# Patient Record
Sex: MALE | Race: ASIAN | NOT HISPANIC OR LATINO | Employment: OTHER | URBAN - METROPOLITAN AREA
[De-identification: names, ages, dates, MRNs, and addresses within clinical notes are randomized per-mention and may not be internally consistent; named-entity substitution may affect disease eponyms.]

---

## 2021-02-23 ENCOUNTER — APPOINTMENT (OUTPATIENT)
Dept: INTERPRETER SERVICES | Facility: CLINIC | Age: 71
End: 2021-02-23
Payer: COMMERCIAL

## 2021-02-23 ENCOUNTER — HOSPITAL ENCOUNTER (INPATIENT)
Facility: CLINIC | Age: 71
LOS: 1 days | Discharge: HOME | DRG: 546 | End: 2021-02-23
Attending: EMERGENCY MEDICINE | Admitting: INTERNAL MEDICINE
Payer: COMMERCIAL

## 2021-02-23 ENCOUNTER — PATIENT OUTREACH (OUTPATIENT)
Dept: CARE COORDINATION | Facility: CLINIC | Age: 71
End: 2021-02-23

## 2021-02-23 ENCOUNTER — APPOINTMENT (OUTPATIENT)
Dept: GENERAL RADIOLOGY | Facility: CLINIC | Age: 71
DRG: 546 | End: 2021-02-23
Attending: EMERGENCY MEDICINE
Payer: COMMERCIAL

## 2021-02-23 VITALS
DIASTOLIC BLOOD PRESSURE: 70 MMHG | OXYGEN SATURATION: 97 % | WEIGHT: 158.07 LBS | TEMPERATURE: 98.1 F | RESPIRATION RATE: 20 BRPM | SYSTOLIC BLOOD PRESSURE: 109 MMHG | HEART RATE: 83 BPM

## 2021-02-23 DIAGNOSIS — I27.20 PULMONARY HYPERTENSION (H): ICD-10-CM

## 2021-02-23 DIAGNOSIS — R06.00 DYSPNEA, UNSPECIFIED TYPE: ICD-10-CM

## 2021-02-23 DIAGNOSIS — Z11.52 ENCOUNTER FOR SCREENING LABORATORY TESTING FOR SEVERE ACUTE RESPIRATORY SYNDROME CORONAVIRUS 2 (SARS-COV-2): ICD-10-CM

## 2021-02-23 DIAGNOSIS — M34.9 SCLERODERMA (H): ICD-10-CM

## 2021-02-23 LAB
ALBUMIN SERPL-MCNC: 3.5 G/DL (ref 3.4–5)
ALBUMIN SERPL-MCNC: 3.8 G/DL (ref 3.4–5)
ALP SERPL-CCNC: 369 U/L (ref 40–150)
ALT SERPL W P-5'-P-CCNC: 54 U/L (ref 0–70)
ANION GAP SERPL CALCULATED.3IONS-SCNC: 4 MMOL/L (ref 3–14)
ANION GAP SERPL CALCULATED.3IONS-SCNC: 6 MMOL/L (ref 3–14)
AST SERPL W P-5'-P-CCNC: 62 U/L (ref 0–45)
BASOPHILS # BLD AUTO: 0.1 10E9/L (ref 0–0.2)
BASOPHILS NFR BLD AUTO: 0.9 %
BILIRUB SERPL-MCNC: 0.6 MG/DL (ref 0.2–1.3)
BUN SERPL-MCNC: 32 MG/DL (ref 7–30)
BUN SERPL-MCNC: 36 MG/DL (ref 7–30)
CALCIUM SERPL-MCNC: 9.2 MG/DL (ref 8.5–10.1)
CALCIUM SERPL-MCNC: 9.2 MG/DL (ref 8.5–10.1)
CHLORIDE SERPL-SCNC: 112 MMOL/L (ref 94–109)
CHLORIDE SERPL-SCNC: 114 MMOL/L (ref 94–109)
CO2 SERPL-SCNC: 22 MMOL/L (ref 20–32)
CO2 SERPL-SCNC: 25 MMOL/L (ref 20–32)
CREAT SERPL-MCNC: 1.15 MG/DL (ref 0.66–1.25)
CREAT SERPL-MCNC: 1.27 MG/DL (ref 0.66–1.25)
DIFFERENTIAL METHOD BLD: ABNORMAL
EOSINOPHIL # BLD AUTO: 0.1 10E9/L (ref 0–0.7)
EOSINOPHIL NFR BLD AUTO: 2 %
ERYTHROCYTE [DISTWIDTH] IN BLOOD BY AUTOMATED COUNT: 17.6 % (ref 10–15)
ERYTHROCYTE [DISTWIDTH] IN BLOOD BY AUTOMATED COUNT: 17.7 % (ref 10–15)
FLUAV RNA RESP QL NAA+PROBE: NEGATIVE
FLUBV RNA RESP QL NAA+PROBE: NEGATIVE
GFR SERPL CREATININE-BSD FRML MDRD: 56 ML/MIN/{1.73_M2}
GFR SERPL CREATININE-BSD FRML MDRD: 63 ML/MIN/{1.73_M2}
GLUCOSE SERPL-MCNC: 107 MG/DL (ref 70–99)
GLUCOSE SERPL-MCNC: 107 MG/DL (ref 70–99)
HCT VFR BLD AUTO: 42.8 % (ref 40–53)
HCT VFR BLD AUTO: 43.9 % (ref 40–53)
HGB BLD-MCNC: 13.6 G/DL (ref 13.3–17.7)
HGB BLD-MCNC: 14.4 G/DL (ref 13.3–17.7)
IMM GRANULOCYTES # BLD: 0 10E9/L (ref 0–0.4)
IMM GRANULOCYTES NFR BLD: 0.2 %
INTERPRETATION ECG - MUSE: NORMAL
LABORATORY COMMENT REPORT: NORMAL
LYMPHOCYTES # BLD AUTO: 1.1 10E9/L (ref 0.8–5.3)
LYMPHOCYTES NFR BLD AUTO: 19.4 %
MAGNESIUM SERPL-MCNC: 2.2 MG/DL (ref 1.6–2.3)
MCH RBC QN AUTO: 28.8 PG (ref 26.5–33)
MCH RBC QN AUTO: 29.3 PG (ref 26.5–33)
MCHC RBC AUTO-ENTMCNC: 31.8 G/DL (ref 31.5–36.5)
MCHC RBC AUTO-ENTMCNC: 32.8 G/DL (ref 31.5–36.5)
MCV RBC AUTO: 89 FL (ref 78–100)
MCV RBC AUTO: 91 FL (ref 78–100)
MONOCYTES # BLD AUTO: 0.4 10E9/L (ref 0–1.3)
MONOCYTES NFR BLD AUTO: 6.5 %
NEUTROPHILS # BLD AUTO: 4 10E9/L (ref 1.6–8.3)
NEUTROPHILS NFR BLD AUTO: 71 %
NRBC # BLD AUTO: 0 10*3/UL
NRBC BLD AUTO-RTO: 0 /100
NT-PROBNP SERPL-MCNC: 5161 PG/ML (ref 0–900)
PHOSPHATE SERPL-MCNC: 3.5 MG/DL (ref 2.5–4.5)
PLATELET # BLD AUTO: 125 10E9/L (ref 150–450)
PLATELET # BLD AUTO: 139 10E9/L (ref 150–450)
POTASSIUM SERPL-SCNC: 3.9 MMOL/L (ref 3.4–5.3)
POTASSIUM SERPL-SCNC: 4 MMOL/L (ref 3.4–5.3)
PROT SERPL-MCNC: 7.6 G/DL (ref 6.8–8.8)
RBC # BLD AUTO: 4.72 10E12/L (ref 4.4–5.9)
RBC # BLD AUTO: 4.92 10E12/L (ref 4.4–5.9)
RSV RNA SPEC QL NAA+PROBE: NEGATIVE
SARS-COV-2 RNA RESP QL NAA+PROBE: NEGATIVE
SODIUM SERPL-SCNC: 142 MMOL/L (ref 133–144)
SODIUM SERPL-SCNC: 142 MMOL/L (ref 133–144)
SPECIMEN SOURCE: NORMAL
TROPONIN I SERPL-MCNC: 0.02 UG/L (ref 0–0.04)
WBC # BLD AUTO: 5.2 10E9/L (ref 4–11)
WBC # BLD AUTO: 5.6 10E9/L (ref 4–11)

## 2021-02-23 PROCEDURE — 83735 ASSAY OF MAGNESIUM: CPT | Performed by: EMERGENCY MEDICINE

## 2021-02-23 PROCEDURE — 85025 COMPLETE CBC W/AUTO DIFF WBC: CPT | Performed by: EMERGENCY MEDICINE

## 2021-02-23 PROCEDURE — 99285 EMERGENCY DEPT VISIT HI MDM: CPT | Mod: 25 | Performed by: EMERGENCY MEDICINE

## 2021-02-23 PROCEDURE — 83880 ASSAY OF NATRIURETIC PEPTIDE: CPT | Performed by: EMERGENCY MEDICINE

## 2021-02-23 PROCEDURE — 120N000003 HC R&B IMCU UMMC

## 2021-02-23 PROCEDURE — 96374 THER/PROPH/DIAG INJ IV PUSH: CPT | Performed by: EMERGENCY MEDICINE

## 2021-02-23 PROCEDURE — 80069 RENAL FUNCTION PANEL: CPT | Performed by: STUDENT IN AN ORGANIZED HEALTH CARE EDUCATION/TRAINING PROGRAM

## 2021-02-23 PROCEDURE — 71045 X-RAY EXAM CHEST 1 VIEW: CPT | Mod: 26 | Performed by: RADIOLOGY

## 2021-02-23 PROCEDURE — 87636 SARSCOV2 & INF A&B AMP PRB: CPT | Performed by: EMERGENCY MEDICINE

## 2021-02-23 PROCEDURE — 99234 HOSP IP/OBS SM DT SF/LOW 45: CPT | Mod: GC | Performed by: INTERNAL MEDICINE

## 2021-02-23 PROCEDURE — 36415 COLL VENOUS BLD VENIPUNCTURE: CPT | Performed by: STUDENT IN AN ORGANIZED HEALTH CARE EDUCATION/TRAINING PROGRAM

## 2021-02-23 PROCEDURE — 71045 X-RAY EXAM CHEST 1 VIEW: CPT

## 2021-02-23 PROCEDURE — 250N000011 HC RX IP 250 OP 636: Performed by: STUDENT IN AN ORGANIZED HEALTH CARE EDUCATION/TRAINING PROGRAM

## 2021-02-23 PROCEDURE — 85027 COMPLETE CBC AUTOMATED: CPT | Performed by: STUDENT IN AN ORGANIZED HEALTH CARE EDUCATION/TRAINING PROGRAM

## 2021-02-23 PROCEDURE — C9803 HOPD COVID-19 SPEC COLLECT: HCPCS | Performed by: EMERGENCY MEDICINE

## 2021-02-23 PROCEDURE — 80053 COMPREHEN METABOLIC PANEL: CPT | Performed by: EMERGENCY MEDICINE

## 2021-02-23 PROCEDURE — 250N000013 HC RX MED GY IP 250 OP 250 PS 637: Performed by: STUDENT IN AN ORGANIZED HEALTH CARE EDUCATION/TRAINING PROGRAM

## 2021-02-23 PROCEDURE — 93010 ELECTROCARDIOGRAM REPORT: CPT | Performed by: EMERGENCY MEDICINE

## 2021-02-23 PROCEDURE — 99207 PR CDG-CODE CATEGORY CHANGED: CPT | Performed by: INTERNAL MEDICINE

## 2021-02-23 PROCEDURE — 93005 ELECTROCARDIOGRAM TRACING: CPT | Performed by: EMERGENCY MEDICINE

## 2021-02-23 PROCEDURE — 84484 ASSAY OF TROPONIN QUANT: CPT | Performed by: EMERGENCY MEDICINE

## 2021-02-23 PROCEDURE — 99283 EMERGENCY DEPT VISIT LOW MDM: CPT | Mod: 25 | Performed by: EMERGENCY MEDICINE

## 2021-02-23 RX ORDER — FUROSEMIDE 20 MG
20 TABLET ORAL DAILY
Status: ON HOLD | COMMUNITY
End: 2021-02-23

## 2021-02-23 RX ORDER — SILDENAFIL CITRATE 20 MG/1
20 TABLET ORAL 3 TIMES DAILY
Status: DISCONTINUED | OUTPATIENT
Start: 2021-02-23 | End: 2021-02-23 | Stop reason: HOSPADM

## 2021-02-23 RX ORDER — SILDENAFIL CITRATE 20 MG/1
20 TABLET ORAL 3 TIMES DAILY
COMMUNITY

## 2021-02-23 RX ORDER — ROSUVASTATIN CALCIUM 10 MG/1
10 TABLET, COATED ORAL DAILY
Status: DISCONTINUED | OUTPATIENT
Start: 2021-02-23 | End: 2021-02-23 | Stop reason: HOSPADM

## 2021-02-23 RX ORDER — HEPARIN SODIUM 5000 [USP'U]/.5ML
5000 INJECTION, SOLUTION INTRAVENOUS; SUBCUTANEOUS EVERY 12 HOURS
Status: DISCONTINUED | OUTPATIENT
Start: 2021-02-23 | End: 2021-02-23

## 2021-02-23 RX ORDER — AMLODIPINE BESYLATE 2.5 MG/1
2.5 TABLET ORAL DAILY
COMMUNITY

## 2021-02-23 RX ORDER — ROSUVASTATIN CALCIUM 10 MG/1
10 TABLET, COATED ORAL DAILY
COMMUNITY

## 2021-02-23 RX ORDER — FUROSEMIDE 10 MG/ML
40 INJECTION INTRAMUSCULAR; INTRAVENOUS ONCE
Status: COMPLETED | OUTPATIENT
Start: 2021-02-23 | End: 2021-02-23

## 2021-02-23 RX ORDER — ASPIRIN 81 MG/1
81 TABLET ORAL DAILY
Status: DISCONTINUED | OUTPATIENT
Start: 2021-02-23 | End: 2021-02-23 | Stop reason: HOSPADM

## 2021-02-23 RX ORDER — FAMOTIDINE 20 MG/1
40 TABLET, FILM COATED ORAL AT BEDTIME
Status: DISCONTINUED | OUTPATIENT
Start: 2021-02-23 | End: 2021-02-23 | Stop reason: HOSPADM

## 2021-02-23 RX ORDER — ASPIRIN 81 MG/1
81 TABLET ORAL DAILY
COMMUNITY

## 2021-02-23 RX ORDER — FUROSEMIDE 20 MG
40 TABLET ORAL DAILY
COMMUNITY
Start: 2021-02-23

## 2021-02-23 RX ORDER — TRAMADOL HYDROCHLORIDE 50 MG/1
50 TABLET ORAL DAILY PRN
Status: DISCONTINUED | OUTPATIENT
Start: 2021-02-23 | End: 2021-02-23 | Stop reason: HOSPADM

## 2021-02-23 RX ORDER — ACETAMINOPHEN 325 MG/1
650 TABLET ORAL EVERY 6 HOURS PRN
Status: DISCONTINUED | OUTPATIENT
Start: 2021-02-23 | End: 2021-02-23 | Stop reason: HOSPADM

## 2021-02-23 RX ORDER — LIDOCAINE 40 MG/G
CREAM TOPICAL
Status: DISCONTINUED | OUTPATIENT
Start: 2021-02-23 | End: 2021-02-23 | Stop reason: HOSPADM

## 2021-02-23 RX ADMIN — ASPIRIN 81 MG: 81 TABLET, COATED ORAL at 08:23

## 2021-02-23 RX ADMIN — MACITENTAN 10 MG: 10 TABLET, FILM COATED ORAL at 08:23

## 2021-02-23 RX ADMIN — FUROSEMIDE 40 MG: 10 INJECTION, SOLUTION INTRAVENOUS at 05:04

## 2021-02-23 RX ADMIN — ROSUVASTATIN 10 MG: 10 TABLET, FILM COATED ORAL at 08:23

## 2021-02-23 RX ADMIN — SILDENAFIL 20 MG: 20 TABLET ORAL at 08:23

## 2021-02-23 RX ADMIN — OMEPRAZOLE 40 MG: 20 CAPSULE, DELAYED RELEASE ORAL at 08:23

## 2021-02-23 ASSESSMENT — ENCOUNTER SYMPTOMS
VOMITING: 0
COUGH: 0
POLYDIPSIA: 1
FEVER: 0
ABDOMINAL PAIN: 0
SHORTNESS OF BREATH: 1
NAUSEA: 0
DIARRHEA: 0
CHILLS: 0

## 2021-02-23 ASSESSMENT — ACTIVITIES OF DAILY LIVING (ADL)
ADLS_ACUITY_SCORE: 17
ADLS_ACUITY_SCORE: 17

## 2021-02-23 NOTE — PROGRESS NOTES
Admission          2/23/2021 12:27 AM  -----------------------------------------------------------  Reason for admission: Increased SOB and weakness  Primary team notified of pt arrival.  Admitted from: Home/ED  Via: stretcher  Accompanied by: ED RN  Belongings: Placed at bedside  Admission Profile: Pending  Teaching: orientation to unit and call light- call light within reach, call don't fall, use of console, meal times, when to call for the RN, and enforced importance of safety   Access: PIV x1 SL  Telemetry: No tele orders  Ht./Wt.: complete  Code Status verified on armband: yes  2 RN Skin Assessment Completed By: Trent  Med Rec completed: No  Bed surface reassessed with algorithm and charted: no  New bed surface ordered: no  Report from: Fredrick ALMANZAR

## 2021-02-23 NOTE — ED NOTES
[facility]   ED Nurse to Floor Handoff     Jose Morales is a 70 year old male who speaks Cantonese and lives with family members,  in a home  They arrived in the ED by car from emergency room    ED Chief Complaint: Shortness of Breath and Fatigue    ED Dx;   Final diagnoses:   Dyspnea, unspecified type   Pulmonary hypertension (H)   Scleroderma (H)         Needed?: Yes    Allergies: No Known Allergies.  Past Medical Hx: No past medical history on file.   Baseline Mental status: WDL  Current Mental Status changes: at basesline    Infection present or suspected this encounter: no  Sepsis suspected: No  Isolation type: Special Precautions-COVID-19  Patient tested for COVID 19 prior to admission: YES     Activity level - Baseline/Home:  Independent  Activity Level - Current:   Stand with Assist    Bariatric equipment needed?: No    In the ED these meds were given: Medications - No data to display    Drips running?  No    Home pump: Yes- Remodulin    Current LDAs  Peripheral IV 02/23/21 Right Lower forearm (Active)   Site Assessment WDL 02/23/21 0156   Line Status Saline locked 02/23/21 0156   Phlebitis Scale 0-->no symptoms 02/23/21 0156   Infiltration Scale 0 02/23/21 0156   Infiltration Site Treatment Method  None 02/23/21 0156   Number of days: 0       Labs results:   Labs Ordered and Resulted from Time of ED Arrival Up to the Time of Departure from the ED   CBC WITH PLATELETS DIFFERENTIAL - Abnormal; Notable for the following components:       Result Value    RDW 17.7 (*)     Platelet Count 125 (*)     All other components within normal limits   COMPREHENSIVE METABOLIC PANEL - Abnormal; Notable for the following components:    Chloride 112 (*)     Glucose 107 (*)     Urea Nitrogen 36 (*)     Creatinine 1.27 (*)     GFR Estimate 56 (*)     Alkaline Phosphatase 369 (*)     AST 62 (*)     All other components within normal limits   NT PROBNP INPATIENT   MAGNESIUM    TROPONIN I   PERIPHERAL IV CATHETER       Imaging Studies: No results found for this or any previous visit (from the past 24 hour(s)).    Recent vital signs:   /72   Pulse 78   Temp 98.5  F (36.9  C) (Oral)   Resp 18   Wt 71.7 kg (158 lb)   SpO2 95%     [place] Coma Scale Score: 15 (02/23/21 0123)       Cardiac Rhythm: Normal Sinus  Pt needs tele? See epic  Skin/wound Issues: None    Code Status: GEOFF walters MD    Pain control: good    Nausea control: good    Abnormal labs/tests/findings requiring intervention: see epic    Family present during ED course? No   Family Comments/Social Situation comments: n/a    Tasks needing completion: None    Shannen Wood, RN    [phone] [facility]

## 2021-02-23 NOTE — H&P
[facility]    History and Physical - [facility] Night Service        Date of Admission:  2/23/2021    Assessment & Plan   Jose Morales is a 70 year old male with PMH scleroderma c/b PAH, CKD, T2DM, HTN, h/o lung nodule admitted on 2/23/2021 for worsening SOB.     Shortness of breath  Scleroderma associated PAH   Follows with [facility] (Dr. Mars). On triple PAH therapy, baseline supp O2 of 4L NC. Remodulin last titrated up on 1/27/2021 to 24ng/kg/min. Seen by cards on 2/2 with continued dyspnea sx. Last echo 2/2/2021 with significant RV and RA dysfunction (PAP 87mmHg). Lasix increased to 40mg daily, but due to Cr increase, dosing changed to alternate daily dosing of 40mg, 20mg. Reports compliance with dosing, as well as with his other PAH meds. Patient presented due to sats of 85% despite increasing home O2 to 5L. However, on presentation, he is satting 97% on 4L NC without intervention. Per most recent Cardiology note re: pHTN med titration, pulse ox 76-81 % using 4 L NC with activity, 85-91% and 4 L NC at rest. No recommended up-titration at that point. Overall, does not appear to be requiring more O2 and is at his baseline weight (158lbs). However, given elevated BNP, JVD, LE edema, will diurese overnight and reassess symptoms in AM.   - Continue PTA sildenafil  - Continue PTA Remodulin  - Continue PTA macitentan  - Diuresis: IV Lasix 40mg once overnight, assess response and re-dose as appropriate  - If does not improve with diuresis, consider decreasing remodulin by 1ng/kg/min [id number] (per Dr. Mars's last recommendation noted 2/11/2021).   - Will hold off on repeat echo given recent one on 2/2/2021  - Pulmonology consulted, appreciate recs (per ED provider note, cardiology not accepting admission as etiology of PAH is scleroderma and is pulmonary in origin not cardiac)   - Strict I/Os, daily weights     CKD III: baseline Cr 1.3-1.5. At baseline.  -  Avoid nephrotoxic agents  - Continue to monitor    T2DM, diet controlled: Hgb A1C 6.3% (02/10/2021)  No longer on glipizide or metformin, per most recent PCP note  - Diabetic diet  - Continue PTA baby ASA    HTN: currently normotensive  - Hold PTA amlodipine for now    HLD: Continue PTA rosuvastatin  GERD: Continue PTA famotidine, omeprazole  Pain: Continue PTA tramadol     Diet:   Diabetic  Fluids: None  DVT Prophylaxis: Heparin SQ  Ann Catheter: not present  Code Status:  Full         Disposition Plan   Expected discharge: 2 - 3 days, recommended to prior living arrangement once diuresed, breathing improved, titration of PAH meds if needed.  Entered: Renu Voss MD 02/23/2021, 4:14 AM       Patient will be formally staffed in the AM.    Renu Voss MD  [facility]  Contact information available via [facility] Paging/Directory  Please see sign in/sign out for up to date coverage information  ______________________________________________________________________    Chief Complaint   SOB    History is obtained from the patient using a Cantonese .    History of Present Illness   Jose Morales is a 70 year old male with PMH scleroderma c/b PAH, CKD, T2DM, HTN, h/o lung nodule who presents with worsening SOB.     Patient has longstanding history of PAH 2/2 scleroderma. Follows with [facility]. Last seen 2/2/2021. He had been on triple PAH therapy until July 2020. At that time, they decided to start remodulin and wean off uptravi. He had been doing well up until January 2021, when he noted increased dyspnea. Felt it had worsened with remodulin dose increase. Had been taking lasix 20mg daily without any changes in weight and had some pedal edema. He had an echo performed that same date (2/2/2021) which showed severely enlarged RV chamber size and severely reduced systolic function (estimated RV systolic pressure 87mmHg), severely  enlarged right atrium, mild-mod TR, severely enlarged IVC without inspiratory collapse. There were no s/sx of infection, so it was thought his symptoms were 2/2 volume overload. His Lasix dose was increased to 40mg daily. However, on repeat labs 2/11, his Cr had bumped a bit and dosing was changed to alternating 40mg, 20mg. He reports compliance with this dosing regimen, and reports significant UOP at home with frequent urination. He reports compliance with his other meds and has not run out of any of them. He typically gets SOB with fast walking or going up stairs, relieved with rest. Does not typically get SOB at rest. Today, he felt fatigued and SOB, his wife increased his O2 to 5L and checked his O2 sat at home with a pulse ox and read 85%. They called the nurse line who recommended presentation to the ED. In the ED, patient was hemodynamically stable satting well on 4L NC.       Review of Systems    The 10 point Review of Systems is negative other than noted in the HPI or here.     Past Medical History    I have reviewed this patient's medical history and updated it with pertinent information if needed.     Past Surgical History   I have reviewed this patient's surgical history and updated it with pertinent information if needed.    Social History   Tobacco use: denies  EtOH use: denies  Illicit drug use: denies    Family History   Non-contributory    Prior to Admission Medications   Prior to Admission Medications   Prescriptions Last Dose Informant Patient Reported? Taking?   amLODIPine (NORVASC) 2.5 MG tablet   Yes Yes   Sig: Take 2.5 mg by mouth daily   aspirin 81 MG EC tablet   Yes Yes   Sig: Take 81 mg by mouth daily   furosemide (LASIX) 20 MG tablet   Yes Yes   Sig: Take 20 mg by mouth daily   macitentan (OPSUMIT) 10 MG tablet   Yes Yes   Sig: Take 10 mg by mouth daily   rosuvastatin (CRESTOR) 10 MG tablet   Yes Yes   Sig: Take 10 mg by mouth daily   sildenafil (REVATIO) 20 MG tablet   Yes Yes   Sig: Take  20 mg by mouth 3 times daily      Facility-Administered Medications: None     Allergies   No Known Allergies    Physical Exam   Vital Signs: Temp: 98.5  F (36.9  C) Temp src: Oral BP: 115/72 Pulse: 78   Resp: 18 SpO2: 96 % O2 Device: Nasal cannula Oxygen Delivery: 4 LPM  Weight: 158 lbs 0 oz    GEN: Well nourished, well developed, appears stated age   HEENT: EOMI, no scleral icterus  CV: RRR, warm, well-perfused extremities, ++ JVD to mid neck  RESP: CTAB, normal WOB  GI: soft, non-tender, non-distended, no masses or organomegaly. Subcutaneous infusion sight dressing C/D/I without surrounding erythema, edema, warmth, or tenderness.   MSK: No gross deformities appreciated, 2+ BLE pitting edema  Skin: Warm, dry. No rashes/lesions  Neuro: No gross focal deficits  Psych: Appropriate mood and affect.    Data   Data reviewed today: I reviewed all medications, new labs and imaging results over the last 24 hours.    BNP: 5161  Trop: negative    Recent Labs   Lab 02/23/21  0153   WBC 5.6   HGB 13.6   MCV 91   *      POTASSIUM 4.0   CHLORIDE 112*   CO2 25   BUN 36*   CR 1.27*   ANIONGAP 4   DAYNA 9.2   *   ALBUMIN 3.5   PROTTOTAL 7.6   BILITOTAL 0.6   ALKPHOS 369*   ALT 54   AST 62*   TROPI 0.017     Recent Results (from the past 24 hour(s))   XR Chest Port 1 View    Narrative    EXAM: XR CHEST PORT 1 VW  LOCATION: [facility]  DATE/TIME: 2/23/2021 2:11 AM    INDICATION: Shortness of breath  COMPARISON: None.      Impression    IMPRESSION: Cardiac silhouette is enlarged. Calcified aortic arch. No focal airspace infiltrate. No visible pneumothorax or pleural effusion.     Echo Transthoracic (TTE)    Result Date: 2/2/2021  1. Severely enlarged right ventricular chamber size with severely reduced systolic function (FAC; 12 %, TAPSE; 11 mm). Estimated right ventriuclar systolic pressure; 87 mmHg  2. Severely enlarged right atrial size by visual estimate.  3. Mild-moderate tricuspid valve  regurgitation.  4. Severely enlarged inferior vena cava size with no inspiratory collapse.  5. Tiny pericardial effusion.  6. Small left ventricular chamber size (D-shaped). Calculated left ventricular ejection fraction; 60%  7. Indeterminate left ventricular diastolic function due to fusion of the mitral inflow velocities. Normal left atrial size by visual estimate.  8. Compared to the report of 07/16/2020 the following changes have occurred: a tiny pericardial effusion is present. Side by side comparison of images performed.

## 2021-02-23 NOTE — DISCHARGE SUMMARY
[facility]  Discharge Summary - Medicine & Pediatrics       Date of Admission:  2/23/2021  Date of Discharge:  2/23/2021  2:26 PM  Discharging Provider: Elliot Spann MD  Discharge Service: Marbrendan 5    Discharge Diagnoses   Acute on chronic hypoxemic respiratory failure  Scleroderma associated PAH on triple therapy    Follow-ups Needed After Discharge   Follow-up Appointments     Follow Up and recommended labs and tests      Follow up with primary care provider, Alix Mars M.D., within 7   days for hospital follow- up.  The following labs/tests are recommended:   BMP.           Discharge Disposition   Discharged to home  Condition at discharge: Stable    Hospital Course   Jose Morales is a 70 year old male with PMH scleroderma c/b PAH on triple therapy, CKD, T2DM, HTN, admitted on 2/23/2021 for worsening SOB and hypoxia at home.     Acute on chronic hypoxemic respiratory failure on 4L baseline  Scleroderma associated PAH   Presented due to sats of 85% despite increasing home O2 to 5L. However, on presentation, he was satting 97% on 4L NC without intervention. Follows with Dr Mars at [facility] for PAH and his diuretics had recently been increased. Per recent chart notes with clinic RN, his sats have been in low 80s with exertion. . Last echo 2/2/2021 with significant RV and RA dysfunction (PAP 87mmHg) following recent increase in Remodulin suggesting that he may not be tolerating higher doses. Reports adherence with Lasix and PAH meds. Given elevated NT-proBNP (5700), JVD, LE edema here, he was given 40mg IV furosemide on admission with rapid improvement in both symptoms and edema. He was discharged later the same day, able to walk the halls with sats in the high 80s on 4L and feeling much improved. Renal function also improved with IV diuresis. He is to take 40mg oral Lasix for the next three days and then resume his alternative 40/20mg regimen until follow-up.He has  been struggling with dry mouth, so I also recommended a trial of Biotene. I left a message with Dr Mars to let her know about his admission and recommend to Mr Holly and his son Boyd that he follow-up with her clinic early next week for consideration of further medication adjustment.    Consultations This Hospital Stay   VASCULAR ACCESS CARE ADULT IP CONSULT  PULMONARY GENERAL ADULT IP CONSULT    Code Status   Prior     The patient was discussed with Dr. Maria Ines Palomares MD  [address] [address]  Phone: [phone]  ______________________________________________________________________    Physical Exam   Vital Signs: Temp: 98.1  F (36.7  C) Temp src: Oral BP: 109/70 Pulse: 83   Resp: 20 SpO2: 97 % O2 Device: Nasal cannula Oxygen Delivery: 4 LPM  Weight: 158 lbs 1.12 oz  General Appearance: Sitting up in bed, mildly flushed face,  Respiratory: clear, normal WOB on 4L NC  Cardiovascular: distant, regular, normal rate, JVD just above clavicle at 60 degrees  Ext: trace pretibial pitting edema bilaterally, WWP      Primary Care Physician   Alix Mars M.D.    Discharge Orders      Reason for your hospital stay    You were admitted with shortness of breath and we found that you had too much fluid in your body. We gave you IV Lasix to help you urinate more and you felt much better once that fluid was gone.     Follow Up and recommended labs and tests    Follow up with primary care provider, Alix Mars M.D., within 7 days for hospital follow- up.  The following labs/tests are recommended: BMP.     Activity    Your activity upon discharge: activity as tolerated     Discharge Instructions    Starting tomorrow take 40mg Lasix daily for the next three days and then go back to alternating 40mg and 20mg as you were before. You should call Dr Negro's office to make an appointment for next Monday or Tuesday and have your kidney  labs checked then as well. I called her office to let her know that you were in the hospital here. We are not making any changes to your pulmonary hypertension medicines.    If you notice that you are getting short of breath again or needing more oxygen, you should call your doctor. You should talk to her about whether you might try to take an extra dose of Lasix in the afternoon if this is happening.    Try to buy Biotene at the drugstore to see if it helps your dry mouth.     Diet    Follow this diet upon discharge: 2L fluid restriction and 2 gram sodium restriction       Significant Results and Procedures   Most Recent 3 CBC's:  Recent Labs   Lab Test 02/23/21 0613 02/23/21  0153   WBC 5.2 5.6   HGB 14.4 13.6   MCV 89 91   * 125*     Most Recent 3 BMP's:  Recent Labs   Lab Test 02/23/21 0613 02/23/21  0153    142   POTASSIUM 3.9 4.0   CHLORIDE 114* 112*   CO2 22 25   BUN 32* 36*   CR 1.15 1.27*   ANIONGAP 6 4   DAYNA 9.2 9.2   * 107*     Most Recent 3 BNP's:  Recent Labs   Lab Test 02/23/21  0153   NTBNPI 5,161*       Discharge Medications   Discharge Medication List as of 2/23/2021  1:23 PM      CONTINUE these medications which have CHANGED    Details   furosemide (LASIX) 20 MG tablet Take 2 tablets (40 mg) by mouth daily, Historical         CONTINUE these medications which have NOT CHANGED    Details   amLODIPine (NORVASC) 2.5 MG tablet Take 2.5 mg by mouth daily, Historical      aspirin 81 MG EC tablet Take 81 mg by mouth daily, Historical      macitentan (OPSUMIT) 10 MG tablet Take 10 mg by mouth daily, Historical      rosuvastatin (CRESTOR) 10 MG tablet Take 10 mg by mouth daily, Historical      sildenafil (REVATIO) 20 MG tablet Take 20 mg by mouth 3 times daily, Historical           Allergies   Allergies   Allergen Reactions     Quinolones Other (See Comments), Rash and Unknown

## 2021-02-23 NOTE — ED PROVIDER NOTES
[place] EMERGENCY DEPARTMENT ([facility])  February 23, 2021  ED 4    History     Chief Complaint   Patient presents with     Shortness of Breath     Fatigue     The history is provided by the patient and medical records. A  was used (professional ).     Jose Morales is a 70 year old male with a history of scleroderma, scleroderma-associated PAH, CKD, type 2 diabetes, and lung nodule who presents to the ED with complaint of increasing shortness of breath today.  He is on a continuous Remodulin infusion amongst others.  He follows primarily at [facility].  He states that today he has been feeling more short of breath, even at rest.  Of note, he primarily speaks Cantonese,  was used.  Due to the language barrier there was some difficulty in communication.  He indicates to me that he typically uses 4 to 4-1/2 L of home O2 but today had to turn the oxygen up to 5-6, as he felt short of breath even while lying in bed.  No cough or chest pain.  No fever, abdominal pain, vomiting, diarrhea.  He states he has been urinating more and feeling more thirsty but beyond that his only complaint is his breathing.  He states that currently while lying in bed calmly he is feeling okay.  He denies any recent changes in his medications over the last week and states that he has been compliant.     PAST MEDICAL HISTORY: No past medical history on file.    PAST SURGICAL HISTORY: No past surgical history on file.    Past medical history, past surgical history, medications, and allergies were reviewed with the patient. Additional pertinent items: None    FAMILY HISTORY: No family history on file.    SOCIAL HISTORY:   Social History     Tobacco Use     Smoking status: Not on file   Substance Use Topics     Alcohol use: Not on file     Social history was reviewed with the patient. Additional pertinent items: None      Current Discharge Medication List      CONTINUE these medications which  have NOT CHANGED    Details   amLODIPine (NORVASC) 2.5 MG tablet Take 2.5 mg by mouth daily      aspirin 81 MG EC tablet Take 81 mg by mouth daily      furosemide (LASIX) 20 MG tablet Take 20 mg by mouth daily      macitentan (OPSUMIT) 10 MG tablet Take 10 mg by mouth daily      rosuvastatin (CRESTOR) 10 MG tablet Take 10 mg by mouth daily      sildenafil (REVATIO) 20 MG tablet Take 20 mg by mouth 3 times daily                Allergies   Allergen Reactions     Quinolones Other (See Comments), Rash and Unknown        Review of Systems   Constitutional: Negative for chills and fever.   Respiratory: Positive for shortness of breath (short of breath, even when laying in bed). Negative for cough.    Cardiovascular: Negative for chest pain.   Gastrointestinal: Negative for abdominal pain, diarrhea, nausea and vomiting.   Endocrine: Positive for polydipsia and polyuria.     A complete review of systems was performed with pertinent positives and negatives noted in the HPI, and all other systems negative.    Physical Exam   BP: 100/63  Pulse: 67  Temp: 98.5  F (36.9  C)  Resp: 18  Weight: 71.7 kg (158 lb)  SpO2: 96 %      Physical Exam  Constitutional:       General: He is not in acute distress.     Appearance: He is not diaphoretic.   HENT:      Head: Atraumatic.   Eyes:      General: No scleral icterus.  Cardiovascular:      Heart sounds: Normal heart sounds.   Pulmonary:      Effort: No respiratory distress.      Breath sounds: Normal breath sounds.   Abdominal:      Palpations: Abdomen is soft.      Tenderness: There is no abdominal tenderness.   Musculoskeletal:         General: No deformity.   Skin:     General: Skin is warm.         ED Course        Procedures             EKG Interpretation:      Interpreted by Guera Hyde MD  Time reviewed: 0110  Symptoms at time of EKG: None   Rhythm: 1 degree AV block  Rate: 70  Axis: Normal  Ectopy: premature ventricular contractions (infrequent)  Conduction: right bundle  branch block (incomplete)  ST Segments/ T Waves: TWI in V1-V5  Q Waves: none  Comparison to prior: No old EKG available    Clinical Impression: abnormal ekg                              Results for orders placed or performed during the hospital encounter of 02/23/21 (from the past 24 hour(s))   EKG 12 lead   Result Value Ref Range    Interpretation ECG Click View Image link to view waveform and result    CBC with platelets differential   Result Value Ref Range    WBC 5.6 4.0 - 11.0 10e9/L    RBC Count 4.72 4.4 - 5.9 10e12/L    Hemoglobin 13.6 13.3 - 17.7 g/dL    Hematocrit 42.8 40.0 - 53.0 %    MCV 91 78 - 100 fl    MCH 28.8 26.5 - 33.0 pg    MCHC 31.8 31.5 - 36.5 g/dL    RDW 17.7 (H) 10.0 - 15.0 %    Platelet Count 125 (L) 150 - 450 10e9/L    Diff Method Automated Method     % Neutrophils 71.0 %    % Lymphocytes 19.4 %    % Monocytes 6.5 %    % Eosinophils 2.0 %    % Basophils 0.9 %    % Immature Granulocytes 0.2 %    Nucleated RBCs 0 0 /100    Absolute Neutrophil 4.0 1.6 - 8.3 10e9/L    Absolute Lymphocytes 1.1 0.8 - 5.3 10e9/L    Absolute Monocytes 0.4 0.0 - 1.3 10e9/L    Absolute Eosinophils 0.1 0.0 - 0.7 10e9/L    Absolute Basophils 0.1 0.0 - 0.2 10e9/L    Abs Immature Granulocytes 0.0 0 - 0.4 10e9/L    Absolute Nucleated RBC 0.0    Comprehensive metabolic panel   Result Value Ref Range    Sodium 142 133 - 144 mmol/L    Potassium 4.0 3.4 - 5.3 mmol/L    Chloride 112 (H) 94 - 109 mmol/L    Carbon Dioxide 25 20 - 32 mmol/L    Anion Gap 4 3 - 14 mmol/L    Glucose 107 (H) 70 - 99 mg/dL    Urea Nitrogen 36 (H) 7 - 30 mg/dL    Creatinine 1.27 (H) 0.66 - 1.25 mg/dL    GFR Estimate 56 (L) >60 mL/min/[1.73_m2]    GFR Estimate If Black 65 >60 mL/min/[1.73_m2]    Calcium 9.2 8.5 - 10.1 mg/dL    Bilirubin Total 0.6 0.2 - 1.3 mg/dL    Albumin 3.5 3.4 - 5.0 g/dL    Protein Total 7.6 6.8 - 8.8 g/dL    Alkaline Phosphatase 369 (H) 40 - 150 U/L    ALT 54 0 - 70 U/L    AST 62 (H) 0 - 45 U/L   BNP   Result Value Ref Range     N-Terminal Pro BNP Inpatient 5,161 (H) 0 - 900 pg/mL   Magnesium   Result Value Ref Range    Magnesium 2.2 1.6 - 2.3 mg/dL   Troponin I   Result Value Ref Range    Troponin I ES 0.017 0.000 - 0.045 ug/L   XR Chest Port 1 View    Narrative    EXAM: XR CHEST PORT 1 VW  LOCATION: [facility]  DATE/TIME: 2/23/2021 2:11 AM    INDICATION: Shortness of breath  COMPARISON: None.      Impression    IMPRESSION: Cardiac silhouette is enlarged. Calcified aortic arch. No focal airspace infiltrate. No visible pneumothorax or pleural effusion.   Symptomatic Influenza A/B & SARS-CoV2 (COVID-19) Virus PCR Multiplex    Specimen: Nasopharyngeal   Result Value Ref Range    Flu A/B & SARS-COV-2 PCR Source Nasopharyngeal     SARS-CoV-2 PCR Result NEGATIVE     Influenza A PCR Negative NEG^Negative    Influenza B PCR Negative NEG^Negative    Respiratory Syncytial Virus PCR Negative NEG^Negative    Flu A/B & SARS-CoV-2 PCR Comment (Note)    Renal panel   Result Value Ref Range    Sodium 142 133 - 144 mmol/L    Potassium 3.9 3.4 - 5.3 mmol/L    Chloride 114 (H) 94 - 109 mmol/L    Carbon Dioxide 22 20 - 32 mmol/L    Anion Gap 6 3 - 14 mmol/L    Glucose 107 (H) 70 - 99 mg/dL    Urea Nitrogen 32 (H) 7 - 30 mg/dL    Creatinine 1.15 0.66 - 1.25 mg/dL    GFR Estimate 63 >60 mL/min/[1.73_m2]    GFR Estimate If Black 73 >60 mL/min/[1.73_m2]    Calcium 9.2 8.5 - 10.1 mg/dL    Phosphorus 3.5 2.5 - 4.5 mg/dL    Albumin 3.8 3.4 - 5.0 g/dL   CBC with platelets   Result Value Ref Range    WBC 5.2 4.0 - 11.0 10e9/L    RBC Count 4.92 4.4 - 5.9 10e12/L    Hemoglobin 14.4 13.3 - 17.7 g/dL    Hematocrit 43.9 40.0 - 53.0 %    MCV 89 78 - 100 fl    MCH 29.3 26.5 - 33.0 pg    MCHC 32.8 31.5 - 36.5 g/dL    RDW 17.6 (H) 10.0 - 15.0 %    Platelet Count 139 (L) 150 - 450 10e9/L     Medications   aspirin EC tablet 81 mg (has no administration in time range)   rosuvastatin (CRESTOR) tablet 10 mg (has no administration in time range)   macitentan (OPSUMIT)  tablet 10 mg (has no administration in time range)   sildenafil (REVATIO) tablet 20 mg (has no administration in time range)   lidocaine 1 % 0.1-1 mL (has no administration in time range)   lidocaine (LMX4) cream (has no administration in time range)   sodium chloride (PF) 0.9% PF flush 3 mL (has no administration in time range)   sodium chloride (PF) 0.9% PF flush 3 mL (has no administration in time range)   melatonin tablet 1 mg (has no administration in time range)   heparin ANTICOAGULANT injection 5,000 Units (has no administration in time range)   medication instruction (has no administration in time range)   omeprazole (priLOSEC) CR capsule 40 mg (has no administration in time range)   famotidine (PEPCID) tablet 40 mg (has no administration in time range)   traMADol (ULTRAM) tablet 50 mg (has no administration in time range)   acetaminophen (TYLENOL) tablet 650 mg (has no administration in time range)   furosemide (LASIX) injection 40 mg (40 mg Intravenous Given 2/23/21 0504)             Assessments & Plan (with Medical Decision Making)   Patient is satting in the low 90s here on his baseline of 4 L home O2.  However, he states that throughout the day today he was more short of breath than usual, required 5 to 6 L of oxygen and still at times had sats below 90.  He denies any infectious type symptoms, any chest pain.  Chest x-ray did not show any sign of pneumothorax, effusion, airspace infiltrate.  Of a PE is a possibility, it is lower likelihood given the lack of chest pain as well as the fact that currently while at rest he has no shortness of breath.  ACS seems less likely as well given the negative troponin despite the fact that he was symptomatic all day.  I did briefly discuss the patient's situation with the cards to on-call physician, who reported that patients with underlying connective tissue disorder typically are manage by the pulmonary team [facility] at the [facility] rather than the cardiology team  given that the pulmonary hypertension is likely secondary to underlying pulmonary disease from the connective tissue disorder.  I am concerned about the fact that he had required increasing amounts of oxygen throughout the day today and do think admission for further management is appropriate, particularly in light of his significant pharmacologic management including continuous Remodulin infusion.    Dictation Disclaimer: Some of this Note has been completed with voice-recognition dictation software. Although errors are generally corrected real-time, there is the potential for a rare error to be present in the completed chart.      I have reviewed the nursing notes.    I have reviewed the findings, diagnosis, plan and need for follow up with the patient.    Current Discharge Medication List          Final diagnoses:   Dyspnea, unspecified type   Pulmonary hypertension (H)   Scleroderma (H)       2/23/2021   [facility] EMERGENCY DEPARTMENT     Guera Hyde MD  02/23/21 0722       Guera Hyde MD  02/28/21 [id number]

## 2021-02-23 NOTE — ED TRIAGE NOTES
Pt reports to ED with c/o SOB and fatigue. Pt has PMH of pulmonary hypertension, being treated with Remodulin on a continuous infusion pump. Pt is on O2 at baseline, normally 3LPM, currently at 4 LPM and Sats are at 96%

## 2021-02-23 NOTE — ED NOTES
Son called, update given. Plan to bring Remodulin in tomorrow and have verified by pharmacy in case there is a need to give.

## 2021-02-24 NOTE — PROGRESS NOTES
[facility]: Post-Discharge Note  SITUATION                                                      Admission:    Admission Date: 02/23/21   Reason for Admission: Acute on chronic hypoxemic respiratory failure  Discharge:   Discharge Date: 02/23/21  Discharge Diagnosis: Acute on chronic hypoxemic respiratory failure    BACKGROUND                                                         Jose Morales is a 70 year old male with PMH scleroderma c/b PAH on triple therapy, CKD, T2DM, HTN, admitted on 2/23/2021 for worsening SOB and hypoxia at home.        ASSESSMENT      Discharge Assessment  Patient reports symptoms are: Improved  Does the patient have all of their medications?: Yes  Does patient know what their new medications are for?: Yes  Does patient have a follow-up appointment scheduled?: No  Does patient have any other questions or concerns?: No    Post-op  Did the patient have surgery or a procedure: Yes  Fever: No  Chills: No  Eating & Drinking: eating and drinking without complaints/concerns  Bowel Function: normal  Urinary Status: voiding without complaint/concerns        PLAN                                                      Outpatient Plan:     Follow up with primary care provider, Alix Mars M.D., within 7 days for hospital follow- up.  The following labs/tests are recommended: BMP.         No future appointments.        Sherley Alvarado

## 2022-01-01 ENCOUNTER — APPOINTMENT (OUTPATIENT)
Dept: GENERAL RADIOLOGY | Facility: CLINIC | Age: 72
End: 2022-01-01
Attending: EMERGENCY MEDICINE
Payer: COMMERCIAL

## 2022-01-01 ENCOUNTER — HOSPITAL ENCOUNTER (EMERGENCY)
Facility: CLINIC | Age: 72
Discharge: LEFT BEFORE DISCHARGE | End: 2022-05-13
Admitting: EMERGENCY MEDICINE
Payer: COMMERCIAL

## 2022-01-01 VITALS
HEART RATE: 89 BPM | SYSTOLIC BLOOD PRESSURE: 94 MMHG | OXYGEN SATURATION: 95 % | DIASTOLIC BLOOD PRESSURE: 61 MMHG | TEMPERATURE: 99 F | RESPIRATION RATE: 16 BRPM

## 2022-01-01 LAB
ALBUMIN SERPL-MCNC: 2.2 G/DL (ref 3.4–5)
ALP SERPL-CCNC: 459 U/L (ref 40–150)
ALT SERPL W P-5'-P-CCNC: 29 U/L (ref 0–70)
ANION GAP SERPL CALCULATED.3IONS-SCNC: 9 MMOL/L (ref 3–14)
AST SERPL W P-5'-P-CCNC: 133 U/L (ref 0–45)
BASOPHILS # BLD AUTO: 0 10E3/UL (ref 0–0.2)
BASOPHILS NFR BLD AUTO: 0 %
BILIRUB SERPL-MCNC: 0.6 MG/DL (ref 0.2–1.3)
BUN SERPL-MCNC: 21 MG/DL (ref 7–30)
CALCIUM SERPL-MCNC: 6.4 MG/DL (ref 8.5–10.1)
CHLORIDE BLD-SCNC: 104 MMOL/L (ref 94–109)
CO2 SERPL-SCNC: 28 MMOL/L (ref 20–32)
CREAT SERPL-MCNC: 0.82 MG/DL (ref 0.66–1.25)
EOSINOPHIL # BLD AUTO: 0 10E3/UL (ref 0–0.7)
EOSINOPHIL NFR BLD AUTO: 0 %
ERYTHROCYTE [DISTWIDTH] IN BLOOD BY AUTOMATED COUNT: 22.4 % (ref 10–15)
GFR SERPL CREATININE-BSD FRML MDRD: >90 ML/MIN/1.73M2
GLUCOSE BLD-MCNC: 104 MG/DL (ref 70–99)
HCT VFR BLD AUTO: 43.5 % (ref 40–53)
HGB BLD-MCNC: 13.6 G/DL (ref 13.3–17.7)
IMM GRANULOCYTES # BLD: 0.1 10E3/UL
IMM GRANULOCYTES NFR BLD: 1 %
LYMPHOCYTES # BLD AUTO: 0.6 10E3/UL (ref 0.8–5.3)
LYMPHOCYTES NFR BLD AUTO: 6 %
MCH RBC QN AUTO: 26.5 PG (ref 26.5–33)
MCHC RBC AUTO-ENTMCNC: 31.3 G/DL (ref 31.5–36.5)
MCV RBC AUTO: 85 FL (ref 78–100)
MONOCYTES # BLD AUTO: 0.4 10E3/UL (ref 0–1.3)
MONOCYTES NFR BLD AUTO: 4 %
NEUTROPHILS # BLD AUTO: 8 10E3/UL (ref 1.6–8.3)
NEUTROPHILS NFR BLD AUTO: 89 %
NRBC # BLD AUTO: 0 10E3/UL
NRBC BLD AUTO-RTO: 0 /100
PLATELET # BLD AUTO: 142 10E3/UL (ref 150–450)
POTASSIUM BLD-SCNC: 2.7 MMOL/L (ref 3.4–5.3)
PROT SERPL-MCNC: 6.5 G/DL (ref 6.8–8.8)
RBC # BLD AUTO: 5.14 10E6/UL (ref 4.4–5.9)
SODIUM SERPL-SCNC: 141 MMOL/L (ref 133–144)
WBC # BLD AUTO: 9.1 10E3/UL (ref 4–11)

## 2022-01-01 PROCEDURE — 71046 X-RAY EXAM CHEST 2 VIEWS: CPT | Mod: 26 | Performed by: RADIOLOGY

## 2022-01-01 PROCEDURE — 36415 COLL VENOUS BLD VENIPUNCTURE: CPT | Performed by: EMERGENCY MEDICINE

## 2022-01-01 PROCEDURE — 80053 COMPREHEN METABOLIC PANEL: CPT | Performed by: EMERGENCY MEDICINE

## 2022-01-01 PROCEDURE — 85025 COMPLETE CBC W/AUTO DIFF WBC: CPT | Performed by: EMERGENCY MEDICINE

## 2022-01-01 PROCEDURE — 82040 ASSAY OF SERUM ALBUMIN: CPT | Performed by: EMERGENCY MEDICINE

## 2022-01-01 PROCEDURE — 999N000104 HC STATISTIC NO CHARGE

## 2022-01-01 PROCEDURE — 71046 X-RAY EXAM CHEST 2 VIEWS: CPT

## 2022-05-12 NOTE — ED TRIAGE NOTES
Pt brought in by family due to worsening oxygen saturation. Pt is 4L at baseline and was desatting to the 70's/80's per family. Pt was put on 10 L by family and oxygen saturation were still low. Pt has pulmonary hypertension on remodulin at baseline. Pt satting 94% on baseline oxygen in triage.    Triage Assessment     Row Name 05/12/22 3437       Triage Assessment (Adult)    Airway WDL WDL       Respiratory WDL    Respiratory WDL WDL       Skin Circulation/Temperature WDL    Skin Circulation/Temperature WDL WDL       Cardiac WDL    Cardiac WDL WDL       Peripheral/Neurovascular WDL    Peripheral Neurovascular WDL WDL       Cognitive/Neuro/Behavioral WDL    Cognitive/Neuro/Behavioral WDL WDL

## 2022-05-12 NOTE — ED PROVIDER NOTES
"ED Provider Note  [facility]      History     Chief Complaint   Patient presents with     Shortness of Breath     HPI  Jose Morales is a 71 year old male with a PMH of traumatic SDH, SAH, intraparenchymal hemorrhage, pulmonary hypertension, oxygen dependence, scleroderma and HCC who presents to the ED today reporting shortness of breath. Patient is on 4 L oxygen at baseline and was desat into the 70s and 80s per family. ***    Exam: CT chest with IV contrast, 4/13/2022, [facility]  Impression:  1. Enlarging bilateral soft tissue density pulmonary nodules are concerning for progression of metastatic disease.   2. A spiculated part solid nodule in the peripheral left upper lobe is similar to 02/03/2022 but has progressively enlarged from earlier exams and is concerning for primary pulmonary neoplasm in the adenocarcinoma spectrum.   3. Healing left rib fracture. A metastasis with pathologic fracture cannot exclude.   4. Stable borderline dilatation of the ascending aorta and enlargement of the pulmonary arteries.   5. Sequelae of previous granulomatous infection.    Past Medical History  No past medical history on file.  No past surgical history on file.  amLODIPine (NORVASC) 2.5 MG tablet  aspirin 81 MG EC tablet  furosemide (LASIX) 20 MG tablet  macitentan (OPSUMIT) 10 MG tablet  rosuvastatin (CRESTOR) 10 MG tablet  sildenafil (REVATIO) 20 MG tablet      Allergies   Allergen Reactions     Quinolones Other (See Comments), Rash and Unknown     Family History  No family history on file.  Social History       Past medical history, past surgical history, medications, allergies, family history, and social history were reviewed with the patient. No additional pertinent items.       Review of Systems  {Complete vs limited ROS:292411::\"A complete review of systems was performed with pertinent positives and negatives noted in the HPI, and all other systems negative.\"}    Physical Exam      Physical " Exam  ***  ED Course      Procedures       {ED Course Selections:100978}              No results found for any visits on 05/12/22.  Medications - No data to display     Assessments & Plan (with Medical Decision Making)   ***    I have reviewed the nursing notes. I have reviewed the findings, diagnosis, plan and need for follow up with the patient.    New Prescriptions    No medications on file       Final diagnoses:   None       --  ***  [facility] EMERGENCY DEPARTMENT  5/12/2022